# Patient Record
Sex: FEMALE | Race: WHITE | URBAN - NONMETROPOLITAN AREA
[De-identification: names, ages, dates, MRNs, and addresses within clinical notes are randomized per-mention and may not be internally consistent; named-entity substitution may affect disease eponyms.]

---

## 2022-11-07 ENCOUNTER — WEB ENCOUNTER (OUTPATIENT)
Dept: URBAN - NONMETROPOLITAN AREA CLINIC 13 | Facility: CLINIC | Age: 57
End: 2022-11-07

## 2022-11-07 ENCOUNTER — OFFICE VISIT (OUTPATIENT)
Dept: URBAN - NONMETROPOLITAN AREA CLINIC 13 | Facility: CLINIC | Age: 57
End: 2022-11-07
Payer: COMMERCIAL

## 2022-11-07 VITALS
HEART RATE: 69 BPM | DIASTOLIC BLOOD PRESSURE: 65 MMHG | SYSTOLIC BLOOD PRESSURE: 119 MMHG | WEIGHT: 284 LBS | BODY MASS INDEX: 45.64 KG/M2 | TEMPERATURE: 97.4 F | HEIGHT: 66 IN

## 2022-11-07 DIAGNOSIS — K64.4 EXTERNAL HEMORRHOID: ICD-10-CM

## 2022-11-07 DIAGNOSIS — Z12.11 COLON CANCER SCREENING: ICD-10-CM

## 2022-11-07 PROCEDURE — 99203 OFFICE O/P NEW LOW 30 MIN: CPT | Performed by: NURSE PRACTITIONER

## 2022-11-07 RX ORDER — METOPROLOL SUCCINATE 25 MG/1
TAKE 1 & 1/2 (ONE & ONE-HALF) TABLETS BY MOUTH ONCE DAILY TABLET, EXTENDED RELEASE ORAL
Qty: 135 EACH | Refills: 1 | Status: ACTIVE | COMMUNITY

## 2022-11-07 RX ORDER — HYDROCORTISONE ACETATE 25 MG/1
1 SUPPOSITORY SUPPOSITORY RECTAL ONCE A DAY
Qty: 14 SUPPOSITORY | Refills: 2 | OUTPATIENT
Start: 2022-11-07 | End: 2022-12-19

## 2022-11-07 NOTE — HPI-TODAY'S VISIT:
Ms. Huitron is a 56-year-old female who presents for hemorrhoid evaluation.  Her past medical history is pertinent for gastric bypass with duodenal switch as well as a defibrillator.  She states that she has been having some intermittent rectal bleeding and pain.  She states she has had 10 children and during her pregnancy with her twins she developed hemorrhoids.  She has tried over-the-counter's with no relief.  She states she is due for colonoscopy in 2 years.  Previous   Colonoscopy normal per patient. Sb

## 2023-06-28 ENCOUNTER — TELEPHONE ENCOUNTER (OUTPATIENT)
Dept: URBAN - NONMETROPOLITAN AREA CLINIC 2 | Facility: CLINIC | Age: 58
End: 2023-06-28

## 2023-06-28 RX ORDER — HYDROCORTISONE ACETATE 25 MG/1
1 SUPPOSITORY SUPPOSITORY RECTAL ONCE A DAY
Qty: 14 SUPPOSITORY | Refills: 2
Start: 2022-11-07 | End: 2023-08-09

## 2024-04-01 ENCOUNTER — OV EP (OUTPATIENT)
Dept: URBAN - NONMETROPOLITAN AREA CLINIC 13 | Facility: CLINIC | Age: 59
End: 2024-04-01
Payer: COMMERCIAL

## 2024-04-01 ENCOUNTER — LAB (OUTPATIENT)
Dept: URBAN - NONMETROPOLITAN AREA CLINIC 13 | Facility: CLINIC | Age: 59
End: 2024-04-01

## 2024-04-01 VITALS
TEMPERATURE: 97.9 F | WEIGHT: 175 LBS | BODY MASS INDEX: 28.12 KG/M2 | HEIGHT: 66 IN | DIASTOLIC BLOOD PRESSURE: 74 MMHG | SYSTOLIC BLOOD PRESSURE: 123 MMHG | HEART RATE: 61 BPM

## 2024-04-01 DIAGNOSIS — Z12.11 COLON CANCER SCREENING: ICD-10-CM

## 2024-04-01 DIAGNOSIS — K64.4 EXTERNAL HEMORRHOID: ICD-10-CM

## 2024-04-01 PROCEDURE — 99213 OFFICE O/P EST LOW 20 MIN: CPT | Performed by: NURSE PRACTITIONER

## 2024-04-01 RX ORDER — METOPROLOL SUCCINATE 25 MG/1
TAKE 1 & 1/2 (ONE & ONE-HALF) TABLETS BY MOUTH ONCE DAILY TABLET, EXTENDED RELEASE ORAL
Qty: 135 EACH | Refills: 1 | Status: ACTIVE | COMMUNITY

## 2024-04-01 NOTE — HPI-TODAY'S VISIT:
Ms. Huitron is a 58-year-old female who presents for colon screen.  Her past medical history is pertinent for gastric bypass with duodenal switch as well as a defibrillator. normal colonoscopy about 11 year ago per pt. sb

## 2024-04-15 ENCOUNTER — COLON (OUTPATIENT)
Dept: URBAN - METROPOLITAN AREA MEDICAL CENTER 1 | Facility: MEDICAL CENTER | Age: 59
End: 2024-04-15
Payer: COMMERCIAL

## 2024-04-15 DIAGNOSIS — K63.89 OTHER SPECIFIED DISEASES OF INTESTINE: ICD-10-CM

## 2024-04-15 DIAGNOSIS — Z12.11 COLON CANCER SCREENING: ICD-10-CM

## 2024-04-15 PROCEDURE — 45385 COLONOSCOPY W/LESION REMOVAL: CPT | Performed by: INTERNAL MEDICINE

## 2024-06-27 ENCOUNTER — OFFICE VISIT (OUTPATIENT)
Dept: URBAN - NONMETROPOLITAN AREA SURGERY CENTER 1 | Facility: SURGERY CENTER | Age: 59
End: 2024-06-27

## 2024-07-17 ENCOUNTER — TELEPHONE ENCOUNTER (OUTPATIENT)
Dept: URBAN - NONMETROPOLITAN AREA CLINIC 2 | Facility: CLINIC | Age: 59
End: 2024-07-17

## 2024-07-17 ENCOUNTER — ERX REFILL RESPONSE (OUTPATIENT)
Dept: URBAN - NONMETROPOLITAN AREA CLINIC 2 | Facility: CLINIC | Age: 59
End: 2024-07-17

## 2024-07-17 RX ORDER — HYDROCORTISONE ACETATE 25 MG/1
1 SUPPOSITORY SUPPOSITORY RECTAL ONCE A DAY
Qty: 14 SUPPOSITORY | Refills: 2 | OUTPATIENT

## 2024-07-17 RX ORDER — HYDROCORTISONE ACETATE 25 MG/1
1 SUPPOSITORY SUPPOSITORY RECTAL ONCE A DAY
Qty: 14 SUPPOSITORY | Refills: 2
Start: 2022-11-07 | End: 2024-08-28

## 2024-07-17 RX ORDER — HYDROCORTISONE ACETATE 25 MG/1
INSERT 1 SUPPOSITORY RECTALLY ONCE DAILY FOR 14 DAYS SUPPOSITORY RECTAL
Qty: 14 EACH | Refills: 0 | OUTPATIENT

## 2024-08-19 ENCOUNTER — TELEPHONE ENCOUNTER (OUTPATIENT)
Dept: URBAN - NONMETROPOLITAN AREA CLINIC 13 | Facility: CLINIC | Age: 59
End: 2024-08-19

## 2024-08-20 PROBLEM — 165346000: Status: ACTIVE | Noted: 2024-08-20

## 2024-09-17 ENCOUNTER — LAB OUTSIDE AN ENCOUNTER (OUTPATIENT)
Dept: URBAN - NONMETROPOLITAN AREA CLINIC 13 | Facility: CLINIC | Age: 59
End: 2024-09-17

## 2024-09-17 ENCOUNTER — LAB OUTSIDE AN ENCOUNTER (OUTPATIENT)
Dept: URBAN - NONMETROPOLITAN AREA CLINIC 2 | Facility: CLINIC | Age: 59
End: 2024-09-17

## 2024-09-17 LAB
A/G RATIO: 1.6
ALBUMIN: 3.6
ALKALINE PHOSPHATASE: 77
ALT (SGPT): 34
ANION GAP: 10
AST (SGOT): 31
BASOPHILS - MAN (DIFF): 3
BASOPHILS MANUAL ABSOLUTE COUNT PAR: 0.07
BILIRUBIN TOTAL: 0.7
BLOOD UREA NITROGEN: 17
BUN / CREAT RATIO: 35
CALCIUM: 9
CHLORIDE: 107
CO2: 29
CREATININE, SERUM: 0.48
EGFR (CKD-EPI): >60
EOSINOPHILS - MAN (DIFF): 7
EOSINOPHILS MANUAL ABSOLUTE COUNT PAR: 0.17
FERRITIN: 117.9
GAMMA GLUTAMYL TRANSFERASE: 31
GLUCOSE: 79
HEMATOCRIT: 39
HEMOGLOBIN: 12.9
HEPATITIS A IGM ANTIBODY: (no result)
HEPATITIS B CORE IGM ANTIBODY: (no result)
HEPATITIS B SURFACE ANTIGEN: (no result)
HEPATITIS C AB REFLEX: (no result)
LYMPHOCYTES - MAN (DIFF): 45
LYMPHS MANUAL ABSOLUTE PAR: 1.17
MEAN CORPUSCULAR HEMOGLOBIN CONC: 33.2
MEAN CORPUSCULAR HEMOGLOBIN: 34.4
MEAN CORPUSCULAR VOLUME: 103.5
MEAN PLATELET VOLUME: 9.8
MONOCYTES - MAN (DIFF): 5
MONOCYTES MANUAL ABSOLUTE COUNT PAR: 0.12
NEUTROPHILS - MAN (DIFF): 41
NEUTROPHILS MANUAL ABSOLUTE COUNT PAR: 1.07
PAR PLATELET MORPHOLOGY: (no result)
PAR RBC MORPHOLOGY: (no result)
PLATELET COUNT: 137
POTASSIUM: 4.1
PROTEIN TOTAL: 5.9
RED BLOOD CELL COUNT: 3.76
RED CELL DISTRIBUTION WIDTH: 12.5
SODIUM: 142
WHITE BLOOD CELL COUNT: 2.6

## 2024-09-20 LAB
MITOCHONDRIA M2 ANTIBODY (IGG): <=20
SMOOTH MUSCLE ANTIBODY: <20

## 2024-09-22 LAB — ANA SCREEN IFA: NEGATIVE

## 2024-09-23 ENCOUNTER — TELEPHONE ENCOUNTER (OUTPATIENT)
Dept: URBAN - NONMETROPOLITAN AREA CLINIC 2 | Facility: CLINIC | Age: 59
End: 2024-09-23

## 2024-09-23 LAB
ALPHA-1 ANTITRYPSIN: 141
GAMMAGLOBULIN; IGA: 209
INTERP CELIAC DISEASE: (no result)
TTG AB IGA: <1

## 2024-09-30 ENCOUNTER — DASHBOARD ENCOUNTERS (OUTPATIENT)
Age: 59
End: 2024-09-30

## 2024-09-30 ENCOUNTER — TELEPHONE ENCOUNTER (OUTPATIENT)
Dept: URBAN - NONMETROPOLITAN AREA CLINIC 2 | Facility: CLINIC | Age: 59
End: 2024-09-30

## 2024-09-30 ENCOUNTER — OFFICE VISIT (OUTPATIENT)
Dept: URBAN - NONMETROPOLITAN AREA CLINIC 13 | Facility: CLINIC | Age: 59
End: 2024-09-30
Payer: COMMERCIAL

## 2024-09-30 VITALS
SYSTOLIC BLOOD PRESSURE: 103 MMHG | HEART RATE: 64 BPM | DIASTOLIC BLOOD PRESSURE: 59 MMHG | WEIGHT: 172 LBS | HEIGHT: 66 IN | TEMPERATURE: 98.3 F | BODY MASS INDEX: 27.64 KG/M2

## 2024-09-30 DIAGNOSIS — R79.89 ELEVATED LIVER FUNCTION TESTS: ICD-10-CM

## 2024-09-30 DIAGNOSIS — Z12.11 COLON CANCER SCREENING: ICD-10-CM

## 2024-09-30 DIAGNOSIS — R19.5 LOOSE STOOLS: ICD-10-CM

## 2024-09-30 LAB — HEMOCHROMATOSIS GENE ANALYSIS: (no result)

## 2024-09-30 PROCEDURE — 99214 OFFICE O/P EST MOD 30 MIN: CPT | Performed by: NURSE PRACTITIONER

## 2024-09-30 RX ORDER — HYDROCORTISONE ACETATE 25 MG/1
INSERT 1 SUPPOSITORY RECTALLY ONCE DAILY FOR 14 DAYS SUPPOSITORY RECTAL
Qty: 14 EACH | Refills: 0 | Status: ACTIVE | COMMUNITY

## 2024-09-30 RX ORDER — METOPROLOL SUCCINATE 25 MG/1
TAKE 1 & 1/2 (ONE & ONE-HALF) TABLETS BY MOUTH ONCE DAILY TABLET, EXTENDED RELEASE ORAL
Qty: 135 EACH | Refills: 1 | Status: ACTIVE | COMMUNITY

## 2024-09-30 RX ORDER — COLESTIPOL HYDROCHLORIDE 1 G/1
2 TABLETS TABLET, FILM COATED ORAL ONCE A DAY
Qty: 60 | Refills: 11 | OUTPATIENT
Start: 2024-09-30

## 2024-09-30 NOTE — HPI-TODAY'S VISIT:
Ms. Huitron is a 58-year-old female who presents for colon screen.  Her past medical history is pertinent for gastric bypass with duodenal switch as well as a defibrillator. she does have chronic loose stool from [facility]Y hx. discussed trialing colestid. questran works, but dislikes powder. typically chronic urgent loose stool typically in am.  elevated LFTs last month. was taking liver cleanse. stopped and LFTs now WNL. sb 4/2024 Colonoscopy with diverticulosis and 1 benign polyp. repeat in 10 years

## 2024-12-30 ENCOUNTER — LAB OUTSIDE AN ENCOUNTER (OUTPATIENT)
Dept: URBAN - NONMETROPOLITAN AREA CLINIC 13 | Facility: CLINIC | Age: 59
End: 2024-12-30

## 2024-12-30 ENCOUNTER — LAB OUTSIDE AN ENCOUNTER (OUTPATIENT)
Dept: URBAN - NONMETROPOLITAN AREA CLINIC 2 | Facility: CLINIC | Age: 59
End: 2024-12-30

## 2024-12-30 LAB
A/G RATIO: 1.6
ALBUMIN: 3.9
ALKALINE PHOSPHATASE: 125
ALT (SGPT): 206
ANION GAP: 10
AST (SGOT): 143
BASOPHILS AUTOMATED ABSOLUTE COUNT: 0
BASOPHILS RELATIVE PERCENT: 0.5
BILIRUBIN TOTAL: 0.6
BLOOD UREA NITROGEN: 17
BUN / CREAT RATIO: 46
CALCIUM: 9
CHLORIDE: 106
CO2: 29
CREATININE, SERUM: 0.37
EGFR (CKD-EPI): >60
EOSINOPHILS AUTOMATED ABSOLUTE COUNT: 0.1
EOSINOPHILS RELATIVE PERCENT: 2.4
GLUCOSE: 87
HEMATOCRIT: 40.8
HEMOGLOBIN: 13.4
LYMPHOCYTES AUTOMATED ABSOLUTE COUNT: 1
LYMPHOCYTES RELATIVE PERCENT: 27.1
MEAN CORPUSCULAR HEMOGLOBIN CONC: 32.7
MEAN CORPUSCULAR HEMOGLOBIN: 32.4
MEAN CORPUSCULAR VOLUME: 99
MEAN PLATELET VOLUME: 9.7
MONOCYTES AUTOMATED ABSOLUTE COUNT: 0.3
MONOCYTES RELATIVE PERCENT: 7
NEUTROPHILS AUTOMATED ABSOLUTE: 2.3
NEUTROPHILS RELATIVE PERCENT: 63
PLATELET COUNT: 169
POTASSIUM: 4
PROTEIN TOTAL: 6.4
RED BLOOD CELL COUNT: 4.13
RED CELL DISTRIBUTION WIDTH: 14.3
SODIUM: 141
WHITE BLOOD CELL COUNT: 3.6

## 2025-01-06 ENCOUNTER — TELEPHONE ENCOUNTER (OUTPATIENT)
Dept: URBAN - NONMETROPOLITAN AREA CLINIC 2 | Facility: CLINIC | Age: 60
End: 2025-01-06

## 2025-01-06 ENCOUNTER — LAB OUTSIDE AN ENCOUNTER (OUTPATIENT)
Dept: URBAN - NONMETROPOLITAN AREA CLINIC 13 | Facility: CLINIC | Age: 60
End: 2025-01-06

## 2025-01-06 ENCOUNTER — OFFICE VISIT (OUTPATIENT)
Dept: URBAN - NONMETROPOLITAN AREA CLINIC 13 | Facility: CLINIC | Age: 60
End: 2025-01-06
Payer: COMMERCIAL

## 2025-01-06 VITALS
DIASTOLIC BLOOD PRESSURE: 70 MMHG | HEIGHT: 66 IN | HEART RATE: 69 BPM | SYSTOLIC BLOOD PRESSURE: 106 MMHG | BODY MASS INDEX: 27.55 KG/M2 | WEIGHT: 171.4 LBS

## 2025-01-06 DIAGNOSIS — Z12.11 COLON CANCER SCREENING: ICD-10-CM

## 2025-01-06 DIAGNOSIS — R19.5 LOOSE STOOLS: ICD-10-CM

## 2025-01-06 DIAGNOSIS — R79.89 ELEVATED LIVER FUNCTION TESTS: ICD-10-CM

## 2025-01-06 PROCEDURE — 99214 OFFICE O/P EST MOD 30 MIN: CPT | Performed by: NURSE PRACTITIONER

## 2025-01-06 RX ORDER — METOPROLOL SUCCINATE 25 MG/1
TAKE 1 & 1/2 (ONE & ONE-HALF) TABLETS BY MOUTH ONCE DAILY TABLET, EXTENDED RELEASE ORAL
Qty: 135 EACH | Refills: 1 | Status: ACTIVE | COMMUNITY

## 2025-01-06 RX ORDER — COLESTIPOL HYDROCHLORIDE 1 G/1
2 TABLETS TABLET, FILM COATED ORAL ONCE A DAY
Qty: 60 | Refills: 11 | OUTPATIENT

## 2025-01-06 RX ORDER — COLESTIPOL HYDROCHLORIDE 1 G/1
2 TABLETS TABLET, FILM COATED ORAL ONCE A DAY
Qty: 60 | Refills: 11 | Status: ACTIVE | COMMUNITY
Start: 2024-09-30

## 2025-01-06 RX ORDER — HYDROCORTISONE ACETATE 25 MG/1
INSERT 1 SUPPOSITORY RECTALLY ONCE DAILY FOR 14 DAYS SUPPOSITORY RECTAL
Qty: 14 EACH | Refills: 0 | Status: ACTIVE | COMMUNITY

## 2025-01-06 NOTE — HPI-TODAY'S VISIT:
Ms. Huitron is a 59-year-old female who presents for colon screen.  Her past medical history is pertinent for gastric bypass with duodenal switch as well as a defibrillator. she does have chronic loose stool from CCY hx. discussed trialing colestid. questran works, but dislikes powder. typically chronic urgent loose stool typically in am. ast/alt elevated in aug. returned to normal when stopped liver cleanse. Is taking several vitamins. LFTs trended back to WNL, but now ast/alt up again. . sb 4/2024 Colonoscopy with diverticulosis and 1 benign polyp. repeat in 10 years

## 2025-02-03 ENCOUNTER — LAB OUTSIDE AN ENCOUNTER (OUTPATIENT)
Dept: URBAN - NONMETROPOLITAN AREA CLINIC 13 | Facility: CLINIC | Age: 60
End: 2025-02-03

## 2025-02-24 ENCOUNTER — LAB OUTSIDE AN ENCOUNTER (OUTPATIENT)
Dept: URBAN - NONMETROPOLITAN AREA CLINIC 2 | Facility: CLINIC | Age: 60
End: 2025-02-24

## 2025-02-24 LAB
A/G RATIO: 1.6
ALBUMIN: 3.7
ALKALINE PHOSPHATASE: 78
ALT (SGPT): 63
ANION GAP: 10
AST (SGOT): 44
BILIRUBIN TOTAL: 0.4
BLOOD UREA NITROGEN: 18
BUN / CREAT RATIO: 40
CALCIUM: 8.5
CHLORIDE: 107
CO2: 30
CREATININE, SERUM: 0.45
EGFR (CKD-EPI): >60
GLUCOSE: 98
POTASSIUM: 4.4
PROTEIN TOTAL: 6
SODIUM: 143

## 2025-02-27 ENCOUNTER — WEB ENCOUNTER (OUTPATIENT)
Dept: URBAN - NONMETROPOLITAN AREA CLINIC 2 | Facility: CLINIC | Age: 60
End: 2025-02-27

## 2025-04-07 ENCOUNTER — OFFICE VISIT (OUTPATIENT)
Dept: URBAN - NONMETROPOLITAN AREA CLINIC 13 | Facility: CLINIC | Age: 60
End: 2025-04-07
Payer: COMMERCIAL

## 2025-04-07 DIAGNOSIS — R19.5 LOOSE STOOLS: ICD-10-CM

## 2025-04-07 DIAGNOSIS — R79.89 ELEVATED LIVER FUNCTION TESTS: ICD-10-CM

## 2025-04-07 DIAGNOSIS — Z12.11 COLON CANCER SCREENING: ICD-10-CM

## 2025-04-07 LAB
A/G RATIO: 1.8
ALBUMIN: 4.1
ALKALINE PHOSPHATASE: 73
ALT (SGPT): 23
ANION GAP: 12
AST (SGOT): 26
BILIRUBIN TOTAL: 0.8
BLOOD UREA NITROGEN: 18
BUN / CREAT RATIO: 42
CALCIUM: 9.1
CHLORIDE: 110
CO2: 25
CREATININE, SERUM: 0.43
EGFR (CKD-EPI): >60
GLUCOSE: 76
POTASSIUM: 3.9
PROTEIN TOTAL: 6.4
SODIUM: 143

## 2025-04-07 PROCEDURE — 99214 OFFICE O/P EST MOD 30 MIN: CPT | Performed by: NURSE PRACTITIONER

## 2025-04-07 RX ORDER — HYDROCORTISONE ACETATE 25 MG/1
INSERT 1 SUPPOSITORY RECTALLY ONCE DAILY FOR 14 DAYS SUPPOSITORY RECTAL
Qty: 14 EACH | Refills: 0 | Status: ACTIVE | COMMUNITY

## 2025-04-07 RX ORDER — COLESTIPOL HYDROCHLORIDE 1 G/1
2 TABLETS TABLET, FILM COATED ORAL ONCE A DAY
Qty: 60 | Refills: 11 | Status: ACTIVE | COMMUNITY

## 2025-04-07 RX ORDER — METOPROLOL SUCCINATE 25 MG/1
TAKE 1 & 1/2 (ONE & ONE-HALF) TABLETS BY MOUTH ONCE DAILY TABLET, EXTENDED RELEASE ORAL
Qty: 135 EACH | Refills: 1 | Status: ACTIVE | COMMUNITY

## 2025-04-07 NOTE — HPI-TODAY'S VISIT:
Ms. Huitron is a 59-year-old female who presents for colon screen.  Her past medical history is pertinent for gastric bypass with duodenal switch as well as a defibrillator. she does have chronic loose stool from CCY hx. discussed trialing colestid. questran works, but dislikes powder. typically chronic urgent loose stool typically in am. ast/alt elevated in aug. returned to normal when stopped liver cleanse. Is taking several vitamins. LFTs trended back to WNL, but now ast/alt up again. . sb 4/2024 Colonoscopy with diverticulosis and 1 benign polyp. repeat in 10 years April 7, 2025 Latoya is a pleasant 59-year-old female who returns for elevated LFTs as well as diarrhea.  We discussed her LFTs have improved and are just marginally elevated with normal right upper quad ultrasound.  Her biggest concern today is persistent diarrhea.  She tried Colestid at last office visit, but felt like it did not work.  PCP switched her to Questran and she is using that along with 1 Imodium daily.  This is mostly controlling her symptoms with 2-3 BMs bowel movements in the AM. Sb

## 2025-04-08 ENCOUNTER — WEB ENCOUNTER (OUTPATIENT)
Dept: URBAN - NONMETROPOLITAN AREA CLINIC 2 | Facility: CLINIC | Age: 60
End: 2025-04-08

## 2025-04-09 ENCOUNTER — LAB OUTSIDE AN ENCOUNTER (OUTPATIENT)
Dept: URBAN - NONMETROPOLITAN AREA CLINIC 2 | Facility: CLINIC | Age: 60
End: 2025-04-09

## 2025-04-14 ENCOUNTER — TELEPHONE ENCOUNTER (OUTPATIENT)
Dept: URBAN - NONMETROPOLITAN AREA CLINIC 2 | Facility: CLINIC | Age: 60
End: 2025-04-14

## 2025-04-14 LAB
ADENOVIRUS F 40/41: NOT DETECTED
CALPROTECTIN, STOOL - QDX: (no result)
CAMPYLOBACTER: NOT DETECTED
CLOSTRIDIUM DIFFICILE: NOT DETECTED
ENTAMOEBA HISTOLYTICA: NOT DETECTED
ENTEROAGGREGATIVE E.COLI: NOT DETECTED
ENTEROTOXIGENIC E.COLI: NOT DETECTED
FECAL FAT, QUALITATIVE: (no result)
GIARDIA LAMBLIA: NOT DETECTED
NOROVIRUS GI/GII: NOT DETECTED
PANCREATICELASTASE ELISA, STOOL: (no result)
ROTAVIRUS A: NOT DETECTED
SHIGA-LIKE TOXIN PRODUCING E.COLI: NOT DETECTED
SHIGA-LIKE TOXIN PRODUCING E.COLI: NOT DETECTED
VIBRIO CHOLERAE: NOT DETECTED
VIBRIO SPP.: NOT DETECTED
YERSINIA ENTEROCOLITICA: NOT DETECTED
YERSINIA ENTEROCOLITICA: NOT DETECTED

## 2025-04-14 RX ORDER — CHOLESTYRAMINE LIGHT 4 G/5.7G
1 SCOOP MIXED WITH WATER OR NON-CARBONATED DRINK POWDER, FOR SUSPENSION ORAL DAILY
Qty: 30 | Refills: 11 | OUTPATIENT
Start: 2025-04-14

## 2025-04-15 ENCOUNTER — TELEPHONE ENCOUNTER (OUTPATIENT)
Dept: URBAN - NONMETROPOLITAN AREA CLINIC 2 | Facility: CLINIC | Age: 60
End: 2025-04-15

## 2025-04-15 PROBLEM — 47367009: Status: ACTIVE | Noted: 2025-04-15

## 2025-04-15 RX ORDER — PANCRELIPASE LIPASE, PANCRELIPASE AMYLASE, AND PANCRELIPASE PROTEASE 149900; 37000; 97300 [USP'U]/1; [USP'U]/1; [USP'U]/1
2 TABLETS WITH EACH MEAL FOR 3 MEALS AND 1 TABLET WITH EACH SNACK FOR 4 SNACKS CAPSULE, DELAYED RELEASE ORAL
Qty: 900 | Refills: 11 | OUTPATIENT
Start: 2025-04-15

## 2025-04-15 RX ORDER — PANCRELIPASE 36000; 180000; 114000 [USP'U]/1; [USP'U]/1; [USP'U]/1
2 TABLETS WITH MEALS, AND 1 TABLET WITH SNACKS CAPSULE, DELAYED RELEASE PELLETS ORAL
Qty: 900 | Refills: 3 | OUTPATIENT
Start: 2025-04-15

## 2025-04-17 ENCOUNTER — TELEPHONE ENCOUNTER (OUTPATIENT)
Dept: URBAN - NONMETROPOLITAN AREA CLINIC 2 | Facility: CLINIC | Age: 60
End: 2025-04-17

## 2025-04-17 ENCOUNTER — ERX REFILL RESPONSE (OUTPATIENT)
Dept: URBAN - NONMETROPOLITAN AREA CLINIC 2 | Facility: CLINIC | Age: 60
End: 2025-04-17

## 2025-04-17 RX ORDER — PANCRELIPASE 36000; 180000; 114000 [USP'U]/1; [USP'U]/1; [USP'U]/1
2 TABLETS WITH MEALS, AND 1 TABLET WITH SNACKS CAPSULE, DELAYED RELEASE PELLETS ORAL
Qty: 900 | Refills: 3 | OUTPATIENT

## 2025-05-07 ENCOUNTER — TELEPHONE ENCOUNTER (OUTPATIENT)
Dept: URBAN - NONMETROPOLITAN AREA CLINIC 2 | Facility: CLINIC | Age: 60
End: 2025-05-07

## 2025-08-04 ENCOUNTER — OFFICE VISIT (OUTPATIENT)
Dept: URBAN - NONMETROPOLITAN AREA CLINIC 2 | Facility: CLINIC | Age: 60
End: 2025-08-04
Payer: COMMERCIAL

## 2025-08-04 ENCOUNTER — LAB OUTSIDE AN ENCOUNTER (OUTPATIENT)
Dept: URBAN - NONMETROPOLITAN AREA CLINIC 2 | Facility: CLINIC | Age: 60
End: 2025-08-04

## 2025-08-04 ENCOUNTER — TELEPHONE ENCOUNTER (OUTPATIENT)
Dept: URBAN - NONMETROPOLITAN AREA CLINIC 2 | Facility: CLINIC | Age: 60
End: 2025-08-04

## 2025-08-04 DIAGNOSIS — R13.19 ESOPHAGEAL DYSPHAGIA: ICD-10-CM

## 2025-08-04 DIAGNOSIS — K58.0 IRRITABLE BOWEL SYNDROME WITH DIARRHEA: ICD-10-CM

## 2025-08-04 DIAGNOSIS — Z12.11 COLON CANCER SCREENING: ICD-10-CM

## 2025-08-04 DIAGNOSIS — R79.89 ELEVATED LIVER FUNCTION TESTS: ICD-10-CM

## 2025-08-04 DIAGNOSIS — R19.5 LOOSE STOOLS: ICD-10-CM

## 2025-08-04 PROCEDURE — 99214 OFFICE O/P EST MOD 30 MIN: CPT | Performed by: NURSE PRACTITIONER

## 2025-08-04 RX ORDER — COLESTIPOL HYDROCHLORIDE 1 G/1
2 TABLETS TABLET, FILM COATED ORAL ONCE A DAY
Qty: 60 | Refills: 11 | Status: ACTIVE | COMMUNITY

## 2025-08-04 RX ORDER — RIFAXIMIN 550 MG/1
1 TABLET TABLET ORAL THREE TIMES A DAY
Qty: 42 TABLET | Refills: 0 | OUTPATIENT
Start: 2025-08-04 | End: 2025-08-18

## 2025-08-04 RX ORDER — PANCRELIPASE 36000; 180000; 114000 [USP'U]/1; [USP'U]/1; [USP'U]/1
2 TABLETS WITH MEALS, AND 1 TABLET WITH SNACKS CAPSULE, DELAYED RELEASE PELLETS ORAL
Qty: 900 | Refills: 3 | Status: ACTIVE | COMMUNITY

## 2025-08-04 RX ORDER — CHOLESTYRAMINE LIGHT 4 G/5.7G
1 SCOOP MIXED WITH WATER OR NON-CARBONATED DRINK POWDER, FOR SUSPENSION ORAL DAILY
Qty: 30 | Refills: 11 | Status: ACTIVE | COMMUNITY
Start: 2025-04-14

## 2025-08-04 RX ORDER — PANCRELIPASE LIPASE, PANCRELIPASE AMYLASE, AND PANCRELIPASE PROTEASE 149900; 37000; 97300 [USP'U]/1; [USP'U]/1; [USP'U]/1
2 TABLETS WITH EACH MEAL FOR 3 MEALS AND 1 TABLET WITH EACH SNACK FOR 4 SNACKS CAPSULE, DELAYED RELEASE ORAL
Qty: 900 | Refills: 11 | Status: ACTIVE | COMMUNITY
Start: 2025-04-15

## 2025-08-04 RX ORDER — METOPROLOL SUCCINATE 25 MG/1
TAKE 1 & 1/2 (ONE & ONE-HALF) TABLETS BY MOUTH ONCE DAILY TABLET, EXTENDED RELEASE ORAL
Qty: 135 EACH | Refills: 1 | Status: ACTIVE | COMMUNITY

## 2025-08-04 RX ORDER — OMEPRAZOLE 20 MG/1
1 CAPSULE 30 MINUTES BEFORE MORNING MEAL CAPSULE, DELAYED RELEASE ORAL ONCE A DAY
Qty: 90 | Refills: 3 | OUTPATIENT
Start: 2025-08-04

## 2025-08-04 RX ORDER — HYDROCORTISONE ACETATE 25 MG/1
INSERT 1 SUPPOSITORY RECTALLY ONCE DAILY FOR 14 DAYS SUPPOSITORY RECTAL
Qty: 14 EACH | Refills: 0 | Status: ACTIVE | COMMUNITY